# Patient Record
(demographics unavailable — no encounter records)

---

## 2024-12-23 NOTE — PHYSICAL EXAM
[NL] : warm, clear [Clear] : left tympanic membrane clear [Erythema] : erythema [Bulging] : bulging [Purulent Effusion] : purulent effusion [FreeTextEntry3] : cerumen removed [FreeTextEntry4] : (+) nasal congestion

## 2024-12-23 NOTE — HISTORY OF PRESENT ILLNESS
[de-identified] : fever and cough [FreeTextEntry6] : 6 yo male who presents acutely for evaluation of fever and cough.  Mother reports that symptoms began since Thursday. His Tmax was 41C, which was yesterday. He did not have fever today.  He has no appetite to solids. He is only drinking water. He is urinating normally. He was able to tolerate cookies this morning but he is not very interested eating. He is making normal amounts of urine. He complains of abdominal pain now. No diarrhea. He has younger sibling with fever and cough. Mother gives a "syrup" for the fever, she does not recall the name but that it is not good for him because he throws up. But he does this after a coughing fit. No blood in the emesis.

## 2024-12-23 NOTE — BEGINNING OF VISIT
[] :  [Time Spent: ____ minutes] : Total time spent using  services: [unfilled] minutes. The patient's primary language is not English thus required  services. [Mother] : mother [Interpreters_IDNumber] : [id number] [Interpreters_FullName] : Bryson [TWNoteComboBox1] : [place]

## 2024-12-23 NOTE — HISTORY OF PRESENT ILLNESS
[de-identified] : fever and cough [FreeTextEntry6] : 6 yo male who presents acutely for evaluation of fever and cough.  Mother reports that symptoms began since Thursday. His Tmax was 41C, which was yesterday. He did not have fever today.  He has no appetite to solids. He is only drinking water. He is urinating normally. He was able to tolerate cookies this morning but he is not very interested eating. He is making normal amounts of urine. He complains of abdominal pain now. No diarrhea. He has younger sibling with fever and cough. Mother gives a "syrup" for the fever, she does not recall the name but that it is not good for him because he throws up. But he does this after a coughing fit. No blood in the emesis.

## 2024-12-23 NOTE — DISCUSSION/SUMMARY
[FreeTextEntry1] : 6 yo male who presents acutely for evaluation of fever and vomiting. Afebrile in office today. Likely has viral illness. PE shows well appearing and well hydrated male child in no acute distress. Moist mucous membranes. There is right sided otitis media.  PLAN R OTITIS MEDIA Reviewed use of tylenol or motrin as needed for fever or pain relief Amoxicllin prescribed, dosing, frequency and potential side effects reviewed. If no improvement within 48 hours, please return to clinic Seek immediate medical attention for any worsening ear pain, ear discharge, tenderness, redness or swelling behind affected ear, headache, high fevers that do not respond to medication or for any other concerning sign or symptom RTC 2 weeks for follow up FEVER Reviewed that fever is a body temperature of 100.4F or more or 38C or more Reviewed dose and frequency of Tylenol to be used Reviewed dose and frequency of Motrin to be used, may be alternated with Tylenol If fever persists 5 days or more with any symptom of persists 3 days or more without any symptoms, or fevers do not respond to medication you are to seek immediate medical attention  VIRAL URI WITH COUGH - Encouraged humidified air, nasal saline with suctioning every 4 hours as needed, and Zarbees for alleviation of cough - RVP/COVID swab sent - Continue to encourage PO intake to fluids, continue to monitor for normal amounts of wet diapers - STRICT return precautions given, reviewed when to seek immediate medical attention including but not limited to return of fevers, inability to tolerate fluids by mouth, decreased urination, rapid or labored breathing or any other concerning sign or symptom  Caretaker expressed understanding of the plan and agreed. All of their questions were answered.

## 2024-12-23 NOTE — BEGINNING OF VISIT
[] :  [Time Spent: ____ minutes] : Total time spent using  services: [unfilled] minutes. The patient's primary language is not English thus required  services. [Mother] : mother [Interpreters_IDNumber] : [id number] [Interpreters_FullName] : Bryson [TWNoteComboBox1] : [place]ican

## 2025-01-27 NOTE — REASON FOR VISIT
[TextEntry] : Patient presented to the clinic with mother for #2 Hep A vaccination. All questions and concerns answered. RN administered HAVRIX 0.5ml IM to left deltoid. Patient tolerated injection, no adverse effect noted.

## 2025-06-28 NOTE — DISCUSSION/SUMMARY
[Normal Growth] : growth [Normal Development] : development [None] : No known medical problems [No Elimination Concerns] : elimination [No Feeding Concerns] : feeding [No Skin Concerns] : skin [Normal Sleep Pattern] : sleep [No Medications] : ~He/She~ is not on any medications [Patient] : patient [Full Activity without restrictions including Physical Education & Athletics] : Full Activity without restrictions including Physical Education & Athletics [FreeTextEntry1] : 7 y/o male child presenting for a well child check.  Height- 10%ile Weight- 26%ile BMI- 67%ile.  HCM- Routine care & anticipatory guidance given - Referred to audiology, dental & optometry for routine screens -Lead screening advised based on raised lead levels in household contact -Quantiferon TB test scheduled for today   Caretaker expressed understanding of the plan and agrees. All questions were answered.

## 2025-06-28 NOTE — DEVELOPMENTAL MILESTONES
[Ties shoes] : ties shoes [Is dry day and night] : is dry day and night [Chooses preferred foods] : chooses preferred foods [Tells a story with a beginning,] : tells a story with a beginning, a middle, and an end [Masters all consonant sounds and] : masters all consonant sounds and combinations, such as "d" or "ch" [Counts 10 objects] : counts 10 objects [Can do simple addition and] : can do simple addition and subtraction with objects [Hops on one foot 3 to 4 times] : hops on one foot 3 to 4 times [Catches small ball with] : catches small ball with 2 hands [Prints 3 or more simple words] : prints 3 or more simple words without copying [Writes first and last name in] : writes first and last name in uppercase or lowercase letters [Cuts most foods with a knife] : does not cut most foods with a knife [Starts/continues conversation with peers] : does not start/continue conversation with peers [Plays and interacts with at least one] : does not plays and interacts with at least one "best friend" [Rides a standard bike] : does not ride a standard bike

## 2025-06-28 NOTE — HISTORY OF PRESENT ILLNESS
[Mother] : mother [whole ___ oz/d] : consumes [unfilled] oz of whole milk per day [Fruit] : fruit [Vegetables] : vegetables [Meat] : meat [Grains] : grains [Eggs] : eggs [Fish] : fish [Dairy] : dairy [___ stools per day] : [unfilled]  stools per day [Firm] : stools are firm consistency [Normal] : Normal [In own bed] : In own bed [Brushing teeth] : Brushing teeth [Yes] : Patient goes to dentist yearly [No] : No cigarette smoke exposure [Car seat in back seat] : Car seat in back seat [Carbon Monoxide Detectors] : Carbon monoxide detectors [Smoke Detectors] : Smoke detectors [Supervised outdoor play] : Supervised outdoor play [NO] : No [< 2 hrs of screen time] : Less than 2 hrs of screen time [Adequate performance] : Adequate performance [FreeTextEntry1] : SDOH Screening Questionnaire   SDOH (Social Determinants of Health) Questionnaire: 1. Housing: Do you worry that in the upcoming months, your family, or child, may not have a safe or stable place to live? no 2. Food security: Within the last 12 months, did the food you bought not last and you did not have money to buy more? no 3. Community: Do you need help getting public benefits like food stamps or WIC? no 4. Transportation: Does your child have chronic medical condition and therefore struggle with transportation to attend medical appointments? no 5. Healthcare Access: Do you need help getting health or dental insurance? no       Result: Negative Screen. No further intervention needed.